# Patient Record
Sex: MALE | NOT HISPANIC OR LATINO | Employment: OTHER | URBAN - METROPOLITAN AREA
[De-identification: names, ages, dates, MRNs, and addresses within clinical notes are randomized per-mention and may not be internally consistent; named-entity substitution may affect disease eponyms.]

---

## 2023-09-15 ENCOUNTER — TRANSCRIBE ORDERS (OUTPATIENT)
Dept: RESPIRATORY THERAPY | Facility: CLINIC | Age: 68
End: 2023-09-15
Payer: OTHER GOVERNMENT

## 2023-09-15 DIAGNOSIS — R06.02 SHORTNESS OF BREATH: Primary | ICD-10-CM

## 2023-10-12 RX ORDER — ALBUTEROL SULFATE 0.83 MG/ML
2.5 SOLUTION RESPIRATORY (INHALATION) ONCE
Status: COMPLETED | OUTPATIENT
Start: 2023-10-13 | End: 2023-10-13

## 2023-10-13 ENCOUNTER — HOSPITAL ENCOUNTER (OUTPATIENT)
Dept: CARDIOLOGY | Facility: CLINIC | Age: 68
Discharge: HOME | End: 2023-10-13
Attending: PHYSICAL MEDICINE & REHABILITATION
Payer: OTHER GOVERNMENT

## 2023-10-13 ENCOUNTER — HOSPITAL ENCOUNTER (OUTPATIENT)
Dept: RESPIRATORY THERAPY | Facility: CLINIC | Age: 68
Discharge: HOME | End: 2023-10-13
Attending: PHYSICAL MEDICINE & REHABILITATION
Payer: OTHER GOVERNMENT

## 2023-10-13 DIAGNOSIS — I25.10 CAD (CORONARY ARTERY DISEASE): ICD-10-CM

## 2023-10-13 DIAGNOSIS — R06.02 SHORTNESS OF BREATH: ICD-10-CM

## 2023-10-13 LAB — LVEF ECHO: NORMAL

## 2023-10-13 PROCEDURE — 250N000009 HC RX 250

## 2023-10-13 PROCEDURE — 255N000002 HC RX 255 OP 636: Performed by: INTERNAL MEDICINE

## 2023-10-13 PROCEDURE — 999N000208 ECHOCARDIOGRAM COMPLETE

## 2023-10-13 PROCEDURE — 94060 EVALUATION OF WHEEZING: CPT

## 2023-10-13 PROCEDURE — 999N000157 HC STATISTIC RCP TIME EA 10 MIN

## 2023-10-13 PROCEDURE — 94060 EVALUATION OF WHEEZING: CPT | Mod: 26 | Performed by: INTERNAL MEDICINE

## 2023-10-13 PROCEDURE — 93306 TTE W/DOPPLER COMPLETE: CPT | Mod: 26 | Performed by: INTERNAL MEDICINE

## 2023-10-13 RX ADMIN — PERFLUTREN 2 ML: 6.52 INJECTION, SUSPENSION INTRAVENOUS at 11:28

## 2023-10-13 RX ADMIN — ALBUTEROL SULFATE 2.5 MG: 2.5 SOLUTION RESPIRATORY (INHALATION) at 11:49

## 2023-10-13 NOTE — PROGRESS NOTES
RESPIRATORY CARE NOTE     Patient Name: Romel Rodriguez  Today's Date: 10/13/2023     PRE/POST SPIROMETRY done. Pt performed tests with good effort. Alb neb given. Test results meet ATS criteria. Results scanned into epic. Pt left in no distress.   Results faxed to MACHO Sanchez, RT

## 2023-10-16 LAB
EXPTIME-PRE: 6.43 SEC
FEF2575-%PRED-POST: 69 %
FEF2575-%PRED-PRE: 38 %
FEF2575-POST: 1.75 L/SEC
FEF2575-PRE: 0.97 L/SEC
FEF2575-PRED: 2.51 L/SEC
FEFMAX-%PRED-PRE: 63 %
FEFMAX-PRE: 5.65 L/SEC
FEFMAX-PRED: 8.85 L/SEC
FEV1-%PRED-PRE: 51 %
FEV1-PRE: 1.64 L
FEV1FEV6-PRE: 69 %
FEV1FEV6-PRED: 78 %
FEV1FVC-PRE: 68 %
FEV1FVC-PRED: 77 %
FIFMAX-PRE: 2.53 L/SEC
FVC-%PRED-PRE: 58 %
FVC-PRE: 2.42 L
FVC-PRED: 4.15 L